# Patient Record
Sex: MALE | Race: WHITE | Employment: FULL TIME | URBAN - METROPOLITAN AREA
[De-identification: names, ages, dates, MRNs, and addresses within clinical notes are randomized per-mention and may not be internally consistent; named-entity substitution may affect disease eponyms.]

---

## 2022-01-09 ENCOUNTER — HOSPITAL ENCOUNTER (EMERGENCY)
Age: 32
Discharge: HOME | End: 2022-01-09
Attending: EMERGENCY MEDICINE
Payer: COMMERCIAL

## 2022-01-09 VITALS
SYSTOLIC BLOOD PRESSURE: 156 MMHG | BODY MASS INDEX: 28.49 KG/M2 | WEIGHT: 199 LBS | HEART RATE: 89 BPM | DIASTOLIC BLOOD PRESSURE: 88 MMHG | HEIGHT: 70 IN | OXYGEN SATURATION: 97 % | TEMPERATURE: 97.6 F

## 2022-01-09 DIAGNOSIS — T78.40XA ALLERGIC REACTION, INITIAL ENCOUNTER: Primary | ICD-10-CM

## 2022-01-09 DIAGNOSIS — L03.114 CELLULITIS OF LEFT UPPER EXTREMITY: ICD-10-CM

## 2022-01-09 DIAGNOSIS — M70.22 OLECRANON BURSITIS OF LEFT ELBOW: ICD-10-CM

## 2022-01-09 PROCEDURE — 99283 EMERGENCY DEPT VISIT LOW MDM: CPT

## 2022-01-09 PROCEDURE — 6370000000 HC RX 637 (ALT 250 FOR IP): Performed by: EMERGENCY MEDICINE

## 2022-01-09 RX ORDER — CEPHALEXIN 500 MG/1
500 CAPSULE ORAL ONCE
Status: COMPLETED | OUTPATIENT
Start: 2022-01-09 | End: 2022-01-09

## 2022-01-09 RX ORDER — SULFAMETHOXAZOLE AND TRIMETHOPRIM 800; 160 MG/1; MG/1
1 TABLET ORAL ONCE
Status: COMPLETED | OUTPATIENT
Start: 2022-01-09 | End: 2022-01-09

## 2022-01-09 RX ORDER — SULFAMETHOXAZOLE AND TRIMETHOPRIM 800; 160 MG/1; MG/1
1 TABLET ORAL 2 TIMES DAILY
Qty: 14 TABLET | Refills: 0 | Status: SHIPPED | OUTPATIENT
Start: 2022-01-09 | End: 2022-01-16

## 2022-01-09 RX ORDER — FLUTICASONE PROPIONATE 50 MCG
SPRAY, SUSPENSION (ML) NASAL
COMMUNITY
Start: 2021-10-08

## 2022-01-09 RX ORDER — CEPHALEXIN 500 MG/1
500 CAPSULE ORAL 4 TIMES DAILY
Qty: 28 CAPSULE | Refills: 0 | Status: SHIPPED | OUTPATIENT
Start: 2022-01-09 | End: 2022-01-16

## 2022-01-09 RX ORDER — AZELASTINE 1 MG/ML
SPRAY, METERED NASAL
COMMUNITY
Start: 2021-10-08

## 2022-01-09 RX ADMIN — SULFAMETHOXAZOLE AND TRIMETHOPRIM 1 TABLET: 800; 160 TABLET ORAL at 03:28

## 2022-01-09 RX ADMIN — CEPHALEXIN 500 MG: 500 CAPSULE ORAL at 03:28

## 2022-01-09 ASSESSMENT — PAIN SCALES - GENERAL: PAINLEVEL_OUTOF10: 0

## 2022-01-09 NOTE — ED TRIAGE NOTES
On dec  04 left index finger was '' smashed and split '' against tailgate of truck cut on finger has healed with small scar visible, circled around finger is area of increased redness width of 2.5 cm same as the band aid he just removed. C/o tenderness and soreness of left elbow doesn't remember a specific injury for elbow. Left elbow  Slight swollen , increased red and warm.  Exam per md

## 2025-01-13 NOTE — ED PROVIDER NOTES
CHIEF COMPLAINT  Chief Complaint   Patient presents with    Wound Check      of left index finger ''smashed and splint  12/4 ''  , left elbow sore x 2 days       HISTORY OF PRESENT ILLNESS  Lito Crisostomo is a 33 y.o. male who presents to the ED complaining of 2 separate conditions. First he complains of itching and drainage from his right left index finger after he smashed it approximately 1 month ago and has been applying Neosporin onto the wound since that time. Patient reports no pain or red streaks of the area. In addition he reports 2 days of pain, redness, warmth of the olecranon of his left elbow with no history of trauma. No red streaks. No fluctuance. No fevers or chills. No constitutional symptoms. No other complaints, modifying factors or associated symptoms. Nursing notes reviewed. No past medical history on file. No past surgical history on file. No family history on file. Social History     Socioeconomic History    Marital status: Single     Spouse name: Not on file    Number of children: Not on file    Years of education: Not on file    Highest education level: Not on file   Occupational History    Not on file   Tobacco Use    Smoking status: Never Smoker    Smokeless tobacco: Not on file   Substance and Sexual Activity    Alcohol use: Yes     Comment: socially    Drug use: No    Sexual activity: Not on file   Other Topics Concern    Not on file   Social History Narrative    Not on file     Social Determinants of Health     Financial Resource Strain:     Difficulty of Paying Living Expenses: Not on file   Food Insecurity:     Worried About Running Out of Food in the Last Year: Not on file    Db of Food in the Last Year: Not on file   Transportation Needs:     Lack of Transportation (Medical): Not on file    Lack of Transportation (Non-Medical):  Not on file   Physical Activity:     Days of Exercise per Week: Not on file    Minutes of Exercise per Session: Not on file   Stress:     Feeling of Stress : Not on file   Social Connections:     Frequency of Communication with Friends and Family: Not on file    Frequency of Social Gatherings with Friends and Family: Not on file    Attends [employer] Services: Not on file    Active Member of [address] [place] [address] or Organizations: Not on file    Attends Club or Organization Meetings: Not on file    Marital Status: Not on file   Intimate Partner Violence:     Fear of Current or Ex-Partner: Not on file    Emotionally Abused: Not on file    Physically Abused: Not on file    Sexually Abused: Not on file   Housing Stability:     Unable to Pay for Housing in the Last Year: Not on file    Number of Dianamoalysha in the Last Year: Not on file    Unstable Housing in the Last Year: Not on file     Current Facility-Administered Medications   Medication Dose Route Frequency Provider Last Rate Last Admin    sulfamethoxazole-trimethoprim (BACTRIM DS;SEPTRA DS) 800-160 MG per tablet 1 tablet  1 tablet Oral Once Aman Brooks MD        cephALEXin (KEFLEX) capsule 500 mg  500 mg Oral Once Aman Brooks MD         Current Outpatient Medications   Medication Sig Dispense Refill    cephALEXin (KEFLEX) 500 MG capsule Take 1 capsule by mouth 4 times daily for 7 days 28 capsule 0    sulfamethoxazole-trimethoprim (BACTRIM DS) 800-160 MG per tablet Take 1 tablet by mouth 2 times daily for 7 days 14 tablet 0    fluticasone (FLONASE) 50 MCG/ACT nasal spray       azelastine (ASTELIN) 0.1 % nasal spray        No Known Allergies    REVIEW OF SYSTEMS  Positives and pertinent negatives as per HPI. Six other systems were reviewed and are negative. Nursing notes pertaining to ROS were reviewed. PHYSICAL EXAM   BP (!) 156/88   Pulse 89   Temp 97.6 °F (36.4 °C) (Tympanic)   Ht 5' 10\" (1.778 m)   Wt 199 lb (90.3 kg)   SpO2 97%   BMI 28.55 kg/m²   GENERAL APPEARANCE: Awake and alert. Cooperative. No acute distress.   HEAD: Normocephalic. Atraumatic. EYES: PERRL. EOM's grossly intact. No scleral icterus, injection or exudate  ENT: Mucous membranes are moist.    EXTREMITIES: Left dorsal index finger over the proximal phalanx reveals a denuded area that is very sharply demarcated and nontender, superficially ulcerated without red streaks or fluctuance and draining some serosanguineous fluid. The left olecranon is tender without fluctuance but red with some superficial surrounding erythema that is also tender but no red streaks. No vesicles or pustules. No necrotic areas. The remainder of the elbow however is nontender over the medial lateral or anterior aspect of the elbow with normal range of motion with supination, pronation flexion extension of the elbow with no pain. SKIN: Warm and dry. NEUROLOGICAL: Alert and oriented. ED COURSE/MDM  Left index finger allergic reaction most likely secondary to prolonged use of Neosporin with possible superficial infection including diagnoses such as impetigo. Patient has no evidence of abscess or lymphangitis. Patient was advised to discontinue neomycin, use wet-to-dry dressings and will be given Bactrim and Keflex for his elbow that should also cover any superficial infection of his finger. Left olecranon bursitis with surrounding cellulitis: No evidence of septic arthritis or gout. No evidence of foreign body. No lymphangitis. No abscess. Keflex and Bactrim x7 days. Warm compresses. Return to the emergency room for worsening or progressive symptoms. Patient was given scripts for the following medications. I counseled patient how to take these medications. New Prescriptions    CEPHALEXIN (KEFLEX) 500 MG CAPSULE    Take 1 capsule by mouth 4 times daily for 7 days    SULFAMETHOXAZOLE-TRIMETHOPRIM (BACTRIM DS) 800-160 MG PER TABLET    Take 1 tablet by mouth 2 times daily for 7 days         CLINICAL IMPRESSION  1. Allergic reaction, initial encounter    2.  Olecranon bursitis of left elbow    3. Cellulitis of left upper extremity        Blood pressure (!) 156/88, pulse 89, temperature 97.6 °F (36.4 °C), temperature source Tympanic, height 5' 10\" (1.778 m), weight 199 lb (90.3 kg), SpO2 97 %.       Follow-up with:  [facility]             Hyacinth Roberson MD  01/09/22 [address] Regimen: Cereve anti itch moisturizer daily Discontinue Regimen: Betamethasone .1% cream\\nRecommended to take 2 months off of applying topical steroids Detail Level: Zone Continue Regimen: doxycycline hyclate 50 mg tablet \\nTake one tablet in am and 2 tablets in pm (or 3 pills daily) for one month\\n\\nclindamycin phosphate 1 % topical swab \\nApply to full face,chest, and back once every morning.\\n\\ntretinoin 0.1 % topical cream \\nApply a pea size amount to the full face every other night, increasing to nightly use as tolerated.